# Patient Record
Sex: FEMALE | Race: WHITE
[De-identification: names, ages, dates, MRNs, and addresses within clinical notes are randomized per-mention and may not be internally consistent; named-entity substitution may affect disease eponyms.]

---

## 2017-05-09 ENCOUNTER — HOSPITAL ENCOUNTER (OUTPATIENT)
Age: 32
Discharge: HOME | End: 2017-05-09
Payer: COMMERCIAL

## 2017-05-09 DIAGNOSIS — O99.89: Primary | ICD-10-CM

## 2017-05-09 DIAGNOSIS — R10.10: ICD-10-CM

## 2017-06-27 ENCOUNTER — HOSPITAL ENCOUNTER (OUTPATIENT)
Dept: HOSPITAL 76 - EMS | Age: 32
Discharge: ANOTHER HOSPITAL | End: 2017-06-27
Attending: SURGERY
Payer: COMMERCIAL

## 2017-06-27 ENCOUNTER — HOSPITAL ENCOUNTER (EMERGENCY)
Dept: HOSPITAL 76 - ED | Age: 32
Discharge: HOME | End: 2017-06-27
Payer: COMMERCIAL

## 2017-06-27 VITALS — DIASTOLIC BLOOD PRESSURE: 60 MMHG | SYSTOLIC BLOOD PRESSURE: 125 MMHG

## 2017-06-27 DIAGNOSIS — R45.851: ICD-10-CM

## 2017-06-27 DIAGNOSIS — F32.89: ICD-10-CM

## 2017-06-27 DIAGNOSIS — R45.850: Primary | ICD-10-CM

## 2017-06-27 LAB
ALBUMIN/GLOB SERPL: 1.1 {RATIO} (ref 1–2.2)
ANION GAP SERPL CALCULATED.4IONS-SCNC: 9 MMOL/L (ref 6–13)
APAP SERPL-MCNC: < 10 UG/ML (ref 10–30)
BASOPHILS NFR BLD AUTO: 0 10^3/UL (ref 0–0.1)
BASOPHILS NFR BLD AUTO: 0.6 %
BILIRUB BLD-MCNC: 0.2 MG/DL (ref 0.2–1)
BUN SERPL-MCNC: 8 MG/DL (ref 6–20)
CALCIUM UR-MCNC: 9.1 MG/DL (ref 8.5–10.3)
CHLORIDE SERPL-SCNC: 110 MMOL/L (ref 101–111)
CO2 SERPL-SCNC: 20 MMOL/L (ref 21–32)
CREAT SERPLBLD-SCNC: 0.7 MG/DL (ref 0.4–1)
EOSINOPHIL # BLD AUTO: 0 10^3/UL (ref 0–0.7)
EOSINOPHIL NFR BLD AUTO: 0.7 %
ERYTHROCYTE [DISTWIDTH] IN BLOOD BY AUTOMATED COUNT: 17.8 % (ref 12–15)
GFRSERPLBLD MDRD-ARVRAT: 98 ML/MIN/{1.73_M2} (ref 89–?)
GLOBULIN SER-MCNC: 3.5 G/DL (ref 2.1–4.2)
GLUCOSE SERPL-MCNC: 83 MG/DL (ref 70–100)
HCT VFR BLD AUTO: 34.6 % (ref 37–47)
HGB UR QL STRIP: 11 G/DL (ref 12–16)
LIPASE SERPL-CCNC: 15 U/L (ref 22–51)
LYMPHOCYTES # SPEC AUTO: 2.3 10^3/UL (ref 1.5–3.5)
LYMPHOCYTES NFR BLD AUTO: 34.8 %
MAGNESIUM SERPL-MCNC: 2.2 MG/DL (ref 1.7–2.8)
MCH RBC QN AUTO: 22.8 PG (ref 27–31)
MCHC RBC AUTO-ENTMCNC: 31.8 G/DL (ref 32–36)
MCV RBC AUTO: 71.9 FL (ref 81–99)
MONOCYTES # BLD AUTO: 0.4 10^3/UL (ref 0–1)
MONOCYTES NFR BLD AUTO: 6.8 %
NEUTROPHILS # BLD AUTO: 3.8 10^3/UL (ref 1.5–6.6)
NEUTROPHILS # SNV AUTO: 6.6 X10^3/UL (ref 4.8–10.8)
NEUTROPHILS NFR BLD AUTO: 57.1 %
NRBC # BLD AUTO: 0 /100WBC
PDW BLD AUTO: 7.5 FL (ref 7.9–10.8)
POTASSIUM SERPL-SCNC: 3.8 MMOL/L (ref 3.5–5)
PROT SPEC-MCNC: 7.2 G/DL (ref 6.7–8.2)
RBC MAR: 4.82 10^6/UL (ref 4.2–5.4)
SALICYLATES SERPL-MCNC: < 6 MG/DL
SODIUM SERPLBLD-SCNC: 139 MMOL/L (ref 135–145)
WBC # BLD: 6.6 X10^3/UL

## 2017-06-27 PROCEDURE — 80329 ANALGESICS NON-OPIOID 1 OR 2: CPT

## 2017-06-27 PROCEDURE — 84443 ASSAY THYROID STIM HORMONE: CPT

## 2017-06-27 PROCEDURE — 83690 ASSAY OF LIPASE: CPT

## 2017-06-27 PROCEDURE — 80053 COMPREHEN METABOLIC PANEL: CPT

## 2017-06-27 PROCEDURE — 99283 EMERGENCY DEPT VISIT LOW MDM: CPT

## 2017-06-27 PROCEDURE — 83735 ASSAY OF MAGNESIUM: CPT

## 2017-06-27 PROCEDURE — 80320 DRUG SCREEN QUANTALCOHOLS: CPT

## 2017-06-27 PROCEDURE — 80307 DRUG TEST PRSMV CHEM ANLYZR: CPT

## 2017-06-27 PROCEDURE — 36415 COLL VENOUS BLD VENIPUNCTURE: CPT

## 2017-06-27 PROCEDURE — 99284 EMERGENCY DEPT VISIT MOD MDM: CPT

## 2017-06-27 PROCEDURE — 85025 COMPLETE CBC W/AUTO DIFF WBC: CPT

## 2017-06-27 NOTE — ED PHYSICIAN DOCUMENTATION
PD HPI MHE





- Stated complaint


Stated Complaint: SI





- Chief complaint


Chief Complaint: MHE





- History obtained from


History obtained from: Patient, EMS, Police





- History of Present Illness


Primary symptom: Suicidal ideation, Homicidal ideation (feeling stressed by 

 baby, but does not have urge/sensation of wanting to act on any of the 

feelings.), Depression (she has felt postpartum depression since prior child 16 

months ago, and had quick new pregnancy (when prior child was only 7 months old)

. Having increased depression and fatigue the past month since birth of newest 

baby.).  No: Suicide attempt


Timing - onset: How many months ago (increased depression the past month, with 

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 Has counselor. Was having 

suicidal thoughts and so brought to ED for evalaution.)


Contributing factors: Family ( child and another 16 month old child).  No

: Substance abuse - ETOH, Substance abuse - drugs


Similar symptoms before: Diagnosis (postpartum depression)


Recently seen: Clinic (counseling weekly)





Review of Systems


Constitutional: denies: Fever, Chills


Nose: denies: Rhinorrhea / runny nose, Congestion


Throat: denies: Sore throat


Respiratory: denies: Cough


GI: denies: Vomiting, Diarrhea


: denies: Dysuria, Frequency, Discharge


Psychiatric: reports: Depressed.  denies: Anxiety, Insomnia


Endocrine: denies: Weight loss


Immunocompromised: denies: Immunocompromised





PD PAST MEDICAL HISTORY





- Past Medical History


Past Medical History: Yes


Cardiovascular: None


Respiratory: None


Neuro: None


Endocrine/Autoimmune: None


GI: None


GYN: Endometriosis


: None


HEENT: None


Psych: Depression, Anxiety, Obsessive compulsive disorder


Musculoskeletal: Osteoarthritis, Fatigue, Chronic back pain


Derm: None


Other Past Medical History: polycystic ovaries





- Past Surgical History


Past Surgical History: Yes


General: Cholecystectomy, Gastric surgery


/GYN:  section, Oophrectomy





- Present Medications


Home Medications: 


 Ambulatory Orders











 Medication  Instructions  Recorded  Confirmed


 


Multivit with Calcium,Iron,Min 1 each PO DAILY PM 16





[Multiple Vitamins For Women]   


 


Paroxetine HCl [Paxil] 40 mg PO DAILY 17














- Allergies


Allergies/Adverse Reactions: 


 Allergies











Allergy/AdvReac Type Severity Reaction Status Date / Time


 


codeine Allergy  Hives Verified 17 15:34














- Social History


Does the pt smoke?: No


Smoking Status: Never smoker





PD ED PE NORMAL





- Vitals


Vital signs reviewed: Yes





- General


General: Alert and oriented X 3, No acute distress, Well developed/nourished





- Neck


Neck: Supple, no meningeal sign, No adenopathy





- Cardiac


Cardiac: RRR, No murmur





- Respiratory


Respiratory: Clear bilaterally





- Abdomen


Abdomen: Soft, Non tender





- Derm


Derm: Normal color, Warm and dry





- Neuro


Neuro: Alert and oriented X 3, No motor deficit, Normal speech





- Psych


Psych: Normal affect.  No: Normal mood (slightly sad but interacts well and 

smiles at times.)





Results





- Vitals


Vitals: 


 Vital Signs - 24 hr











  17





  15:35 16:57


 


Temperature 36.4 C L 


 


Heart Rate 79 67


 


Respiratory 16 





Rate  


 


Blood Pressure 121/90 H 125/60


 


O2 Saturation 100 98








 Oxygen











O2 Source                      Room air

















- Labs


Labs: 


 Laboratory Tests











  17





  16:25 16:25 16:25


 


WBC  6.6  


 


RBC  4.82  


 


Hgb  11.0 L  


 


Hct  34.6 L  


 


MCV  71.9 L  


 


MCH  22.8 L  


 


MCHC  31.8 L  


 


RDW  17.8 H  


 


Plt Count  267  


 


MPV  7.5 L  


 


Neut #  3.8  


 


Lymph #  2.3  


 


Mono #  0.4  


 


Eos #  0.0  


 


Baso #  0.0  


 


Absolute Nucleated RBC  0.00  


 


Nucleated RBCs  0.0  


 


Sodium   139 


 


Potassium   3.8 


 


Chloride   110 


 


Carbon Dioxide   20 L 


 


Anion Gap   9.0 


 


BUN   8 


 


Creatinine   0.7 


 


Estimated GFR (MDRD)   98 


 


Glucose   83 


 


Calcium   9.1 


 


Magnesium   2.2 


 


Total Bilirubin   0.2 


 


AST   20 


 


ALT   20 


 


Alkaline Phosphatase   76 


 


Total Protein   7.2 


 


Albumin   3.7 


 


Globulin   3.5 


 


Albumin/Globulin Ratio   1.1 


 


Lipase   15 L 


 


TSH    0.95


 


Salicylates   < 6.0 


 


Acetaminophen   < 10 L 


 


Ethyl Alcohol   < 5.0 














PD MEDICAL DECISION MAKING





- ED course


Complexity details: considered differential, d/w patient (she says she has had 

thought of hurting herself but no urges to act on it. Is seeking help. Is on 

antidepressant and gets counseling. Children are with grandmother, who will 

stay with the patient as well. ), d/w consultant (SW feels patient is not at 

risk of acting on her feelings right now and the patient has contracted for 

safety and will see counselor tomorrow. )





Departure





- Departure


Disposition: 01 Home, Self Care


Clinical Impression: 


 Postpartum depression





Depression


Qualifiers:


 Depression Type: other depression Qualified Code(s): F32.89 - Other specified 

depressive episodes


Condition: Stable


Record reviewed to determine appropriate education?: Yes


Instructions:  ED Depression


Follow-Up: 


Marcus Gonzalez MD [Primary Care Provider] - 


Comments: 


Continue your usual medications. See your Counselor tomorrow as scheduled. Call 

Crisis Line if you need someone to talk with before that or anytime. 


Discharge Date/Time: 17 16:57

## 2018-01-04 ENCOUNTER — HOSPITAL ENCOUNTER (OUTPATIENT)
Dept: HOSPITAL 76 - LAB | Age: 33
Discharge: HOME | End: 2018-01-04
Attending: PATHOLOGY
Payer: COMMERCIAL

## 2018-01-04 DIAGNOSIS — Z01.89: Primary | ICD-10-CM

## 2018-01-04 PROCEDURE — 36415 COLL VENOUS BLD VENIPUNCTURE: CPT
